# Patient Record
Sex: FEMALE | URBAN - METROPOLITAN AREA
[De-identification: names, ages, dates, MRNs, and addresses within clinical notes are randomized per-mention and may not be internally consistent; named-entity substitution may affect disease eponyms.]

---

## 2022-01-31 ENCOUNTER — OFFICE VISIT (OUTPATIENT)
Dept: URBAN - METROPOLITAN AREA CLINIC 71 | Facility: CLINIC | Age: 72
End: 2022-01-31
Payer: MEDICARE

## 2022-01-31 DIAGNOSIS — Z96.1 PRESENCE OF INTRAOCULAR LENS: ICD-10-CM

## 2022-01-31 DIAGNOSIS — H40.013 OPEN ANGLE WITH BORDERLINE FINDINGS, LOW RISK, BILATERAL: Primary | ICD-10-CM

## 2022-01-31 DIAGNOSIS — H35.372 PUCKERING OF MACULA, LEFT EYE: ICD-10-CM

## 2022-01-31 PROCEDURE — 99203 OFFICE O/P NEW LOW 30 MIN: CPT | Performed by: OPHTHALMOLOGY

## 2022-01-31 PROCEDURE — 92133 CPTRZD OPH DX IMG PST SGM ON: CPT | Performed by: OPHTHALMOLOGY

## 2022-01-31 ASSESSMENT — INTRAOCULAR PRESSURE
OS: 23
OS: 22
OD: 23
OD: 24

## 2022-01-31 NOTE — IMPRESSION/PLAN
Impression: Open angle with borderline findings, low risk, bilateral: H40.013. OCT ordered today and reviewed. RNFL appears healthy - no previous OCT to compare to. IOP is slightly elevated, but okay at this range. Plan: Patient is okay to stay off drops at this time. Return in 6 months for VF and OPTOS imaging.

## 2022-01-31 NOTE — IMPRESSION/PLAN
Impression: Puckering of macula, left eye: H35.372. Discussed diagnosis with patient. Plan: No treatment currently recommended. The patient will monitor vision changes and contact us with any decrease in vision.